# Patient Record
Sex: MALE | Race: WHITE | HISPANIC OR LATINO | Employment: FULL TIME | URBAN - METROPOLITAN AREA
[De-identification: names, ages, dates, MRNs, and addresses within clinical notes are randomized per-mention and may not be internally consistent; named-entity substitution may affect disease eponyms.]

---

## 2021-03-18 ENCOUNTER — IMMUNIZATION (OUTPATIENT)
Dept: FAMILY MEDICINE | Facility: CLINIC | Age: 41
End: 2021-03-18
Payer: OTHER GOVERNMENT

## 2021-03-18 DIAGNOSIS — Z23 NEED FOR VACCINATION: Primary | ICD-10-CM

## 2021-03-18 PROCEDURE — 91300 COVID-19, MRNA, LNP-S, PF, 30 MCG/0.3 ML DOSE VACCINE: ICD-10-PCS | Mod: ,,, | Performed by: NURSE ANESTHETIST, CERTIFIED REGISTERED

## 2021-03-18 PROCEDURE — 91300 COVID-19, MRNA, LNP-S, PF, 30 MCG/0.3 ML DOSE VACCINE: CPT | Mod: ,,, | Performed by: NURSE ANESTHETIST, CERTIFIED REGISTERED

## 2021-03-18 PROCEDURE — 0001A COVID-19, MRNA, LNP-S, PF, 30 MCG/0.3 ML DOSE VACCINE: ICD-10-PCS | Mod: CV19,,, | Performed by: NURSE ANESTHETIST, CERTIFIED REGISTERED

## 2021-03-18 PROCEDURE — 0001A COVID-19, MRNA, LNP-S, PF, 30 MCG/0.3 ML DOSE VACCINE: CPT | Mod: CV19,,, | Performed by: NURSE ANESTHETIST, CERTIFIED REGISTERED

## 2021-04-08 ENCOUNTER — IMMUNIZATION (OUTPATIENT)
Dept: FAMILY MEDICINE | Facility: CLINIC | Age: 41
End: 2021-04-08
Payer: OTHER GOVERNMENT

## 2021-04-08 DIAGNOSIS — Z23 NEED FOR VACCINATION: Primary | ICD-10-CM

## 2021-04-08 PROCEDURE — 0002A COVID-19, MRNA, LNP-S, PF, 30 MCG/0.3 ML DOSE VACCINE: ICD-10-PCS | Mod: CV19,,, | Performed by: FAMILY MEDICINE

## 2021-04-08 PROCEDURE — 0002A COVID-19, MRNA, LNP-S, PF, 30 MCG/0.3 ML DOSE VACCINE: CPT | Mod: CV19,,, | Performed by: FAMILY MEDICINE

## 2021-04-08 PROCEDURE — 91300 COVID-19, MRNA, LNP-S, PF, 30 MCG/0.3 ML DOSE VACCINE: ICD-10-PCS | Mod: ,,, | Performed by: FAMILY MEDICINE

## 2021-04-08 PROCEDURE — 91300 COVID-19, MRNA, LNP-S, PF, 30 MCG/0.3 ML DOSE VACCINE: CPT | Mod: ,,, | Performed by: FAMILY MEDICINE

## 2024-04-11 ENCOUNTER — OFFICE VISIT (OUTPATIENT)
Dept: URGENT CARE | Facility: CLINIC | Age: 44
End: 2024-04-11
Payer: COMMERCIAL

## 2024-04-11 VITALS
DIASTOLIC BLOOD PRESSURE: 70 MMHG | SYSTOLIC BLOOD PRESSURE: 116 MMHG | OXYGEN SATURATION: 96 % | HEIGHT: 66 IN | RESPIRATION RATE: 18 BRPM | TEMPERATURE: 100 F | BODY MASS INDEX: 25.71 KG/M2 | HEART RATE: 106 BPM | WEIGHT: 160 LBS

## 2024-04-11 DIAGNOSIS — J20.9 ACUTE BRONCHITIS, UNSPECIFIED ORGANISM: ICD-10-CM

## 2024-04-11 DIAGNOSIS — R50.9 FEVER, UNSPECIFIED FEVER CAUSE: ICD-10-CM

## 2024-04-11 DIAGNOSIS — Z72.0 VAPES NICOTINE CONTAINING SUBSTANCE: ICD-10-CM

## 2024-04-11 DIAGNOSIS — B96.89 ACUTE BACTERIAL BRONCHITIS: Primary | ICD-10-CM

## 2024-04-11 DIAGNOSIS — J20.8 ACUTE BACTERIAL BRONCHITIS: Primary | ICD-10-CM

## 2024-04-11 LAB
CTP QC/QA: YES
POC MOLECULAR INFLUENZA A AGN: NEGATIVE
POC MOLECULAR INFLUENZA B AGN: NEGATIVE

## 2024-04-11 PROCEDURE — 99204 OFFICE O/P NEW MOD 45 MIN: CPT | Mod: 25,S$GLB,, | Performed by: PHYSICIAN ASSISTANT

## 2024-04-11 PROCEDURE — 87502 INFLUENZA DNA AMP PROBE: CPT | Mod: QW,S$GLB,, | Performed by: PHYSICIAN ASSISTANT

## 2024-04-11 PROCEDURE — 71046 X-RAY EXAM CHEST 2 VIEWS: CPT | Mod: S$GLB,,, | Performed by: RADIOLOGY

## 2024-04-11 PROCEDURE — 94640 AIRWAY INHALATION TREATMENT: CPT | Mod: S$GLB,,, | Performed by: FAMILY MEDICINE

## 2024-04-11 RX ORDER — PREDNISONE 20 MG/1
40 TABLET ORAL
Status: COMPLETED | OUTPATIENT
Start: 2024-04-11 | End: 2024-04-11

## 2024-04-11 RX ORDER — IPRATROPIUM BROMIDE 0.5 MG/2.5ML
0.5 SOLUTION RESPIRATORY (INHALATION)
Status: COMPLETED | OUTPATIENT
Start: 2024-04-11 | End: 2024-04-11

## 2024-04-11 RX ORDER — PROMETHAZINE HYDROCHLORIDE AND DEXTROMETHORPHAN HYDROBROMIDE 6.25; 15 MG/5ML; MG/5ML
5 SYRUP ORAL EVERY 4 HOURS PRN
Qty: 118 ML | Refills: 0 | Status: SHIPPED | OUTPATIENT
Start: 2024-04-11 | End: 2024-04-21

## 2024-04-11 RX ORDER — ALBUTEROL SULFATE 0.83 MG/ML
2.5 SOLUTION RESPIRATORY (INHALATION)
Status: COMPLETED | OUTPATIENT
Start: 2024-04-11 | End: 2024-04-11

## 2024-04-11 RX ORDER — ALBUTEROL SULFATE 90 UG/1
2 AEROSOL, METERED RESPIRATORY (INHALATION) EVERY 6 HOURS PRN
Qty: 6.7 G | Refills: 0 | Status: SHIPPED | OUTPATIENT
Start: 2024-04-11

## 2024-04-11 RX ORDER — AMOXICILLIN AND CLAVULANATE POTASSIUM 875; 125 MG/1; MG/1
1 TABLET, FILM COATED ORAL EVERY 12 HOURS
Qty: 14 TABLET | Refills: 0 | Status: SHIPPED | OUTPATIENT
Start: 2024-04-11 | End: 2024-04-18

## 2024-04-11 RX ORDER — IBUPROFEN 200 MG
800 TABLET ORAL
Status: COMPLETED | OUTPATIENT
Start: 2024-04-11 | End: 2024-04-11

## 2024-04-11 RX ORDER — PREDNISONE 20 MG/1
40 TABLET ORAL DAILY
Qty: 8 TABLET | Refills: 0 | Status: SHIPPED | OUTPATIENT
Start: 2024-04-12 | End: 2024-04-16

## 2024-04-11 RX ADMIN — ALBUTEROL SULFATE 2.5 MG: 0.83 SOLUTION RESPIRATORY (INHALATION) at 09:04

## 2024-04-11 RX ADMIN — Medication 800 MG: at 09:04

## 2024-04-11 RX ADMIN — PREDNISONE 40 MG: 20 TABLET ORAL at 09:04

## 2024-04-11 RX ADMIN — IPRATROPIUM BROMIDE 0.5 MG: 0.5 SOLUTION RESPIRATORY (INHALATION) at 09:04

## 2024-04-11 NOTE — PROGRESS NOTES
"Subjective:      Patient ID: Ronaldo Lynch is a 43 y.o. male.    Vitals:  height is 5' 6" (1.676 m) and weight is 72.6 kg (160 lb). His oral temperature is 100 °F (37.8 °C). His blood pressure is 116/70 and his pulse is 106. His respiration is 18 and oxygen saturation is 96%.     Chief Complaint: Sinus Problem    Patient provider note starts here:  Patient presents to the clinic with complaints of flu-like illness for the past 8 days.  Reports that he really started feeling bad about 3 days ago.  He has been taking Sudafed for his symptoms without significant relief.  He reports cough, nasal congestion, chills but unaware that he had fever on presentation here.  His daughter is ill with similar symptoms as well.  Patient does vape but denies chest pain or SOB.     Sinus Problem  This is a new problem. Episode onset: 8 days ago. The problem has been gradually improving since onset. Maximum temperature: unmeasured. His pain is at a severity of 4/10. The pain is moderate. Associated symptoms include chills, congestion, coughing, diaphoresis, headaches and sinus pressure. Pertinent negatives include no neck pain, shortness of breath or sore throat. Treatments tried: aleve, mucinex, sudafed. The treatment provided mild relief.       Constitution: Positive for chills and sweating. Negative for fever (unmeasured).   HENT:  Positive for congestion, postnasal drip, sinus pain and sinus pressure. Negative for sore throat.    Neck: Negative for neck pain and neck stiffness.   Cardiovascular:  Negative for chest pain.   Respiratory:  Positive for cough and sputum production. Negative for chest tightness, shortness of breath and wheezing.    Gastrointestinal:  Negative for abdominal pain, nausea, vomiting and diarrhea.   Musculoskeletal:  Positive for muscle ache. Negative for pain.   Skin:  Negative for rash and wound.   Allergic/Immunologic: Negative for itching.   Neurological:  Positive for headaches. Negative for numbness " and tingling.      Objective:     Physical Exam   Constitutional: He is oriented to person, place, and time. He appears well-developed. He is cooperative.  Non-toxic appearance. He does not appear ill. No distress.   HENT:   Head: Normocephalic and atraumatic.   Ears:   Right Ear: Hearing, tympanic membrane, external ear and ear canal normal.   Left Ear: Hearing, tympanic membrane, external ear and ear canal normal.   Nose: Congestion present. No mucosal edema, rhinorrhea or nasal deformity. No epistaxis. Right sinus exhibits no maxillary sinus tenderness and no frontal sinus tenderness. Left sinus exhibits no maxillary sinus tenderness and no frontal sinus tenderness.   Mouth/Throat: Uvula is midline and mucous membranes are normal. No trismus in the jaw. Normal dentition. No uvula swelling. Posterior oropharyngeal erythema present. No oropharyngeal exudate or posterior oropharyngeal edema.   Eyes: Conjunctivae and lids are normal. No scleral icterus.   Neck: Trachea normal and phonation normal. Neck supple. No edema present. No erythema present. No neck rigidity present.   Cardiovascular: Normal rate, regular rhythm, normal heart sounds and normal pulses.   Pulmonary/Chest: Effort normal. No respiratory distress. He has no decreased breath sounds. He has wheezes (Decreased air movement noted throughout. Expiratory wheezing noted to the lower lung fields bilaterally.). He has no rhonchi.   Abdominal: Normal appearance.   Musculoskeletal: Normal range of motion.         General: No deformity. Normal range of motion.      Right lower leg: No edema.      Left lower leg: No edema.   Lymphadenopathy:     He has no cervical adenopathy.   Neurological: He is alert and oriented to person, place, and time. He exhibits normal muscle tone. Coordination normal.   Skin: Skin is warm, dry, intact, not diaphoretic and not pale.   Psychiatric: His speech is normal and behavior is normal. Judgment and thought content normal.    Nursing note and vitals reviewed.      Assessment:     1. Acute bacterial bronchitis    2. Fever, unspecified fever cause    3. Vapes nicotine containing substance    4. Acute bronchitis, unspecified organism      XR CHEST PA AND LATERAL  Result Date: 4/11/2024  EXAMINATION: XR CHEST PA AND LATERAL CLINICAL HISTORY: Acute bronchitis, unspecified TECHNIQUE: PA and lateral views of the chest were performed. COMPARISON: None FINDINGS: The lungs are clear, with normal appearance of pulmonary vasculature and no pleural effusion or pneumothorax. The cardiac silhouette is normal in size. The hilar and mediastinal contours are unremarkable. Visualized osseous structures show no acute abnormalities.   No acute radiographic findings in the chest. Electronically signed by: Jacob Horne MD Date: 04/11/2024 Time: 09:24     Results for orders placed or performed in visit on 04/11/24   POCT Influenza A/B MOLECULAR   Result Value Ref Range    POC Molecular Influenza A Ag Negative Negative    POC Molecular Influenza B Ag Negative Negative     Acceptable Yes      Plan:       Acute bacterial bronchitis  -     albuterol nebulizer solution 2.5 mg  -     ipratropium 0.02 % nebulizer solution 0.5 mg  -     predniSONE tablet 40 mg  -     XR CHEST PA AND LATERAL; Future; Expected date: 04/11/2024  -     amoxicillin-clavulanate 875-125mg (AUGMENTIN) 875-125 mg per tablet; Take 1 tablet by mouth every 12 (twelve) hours. for 7 days  Dispense: 14 tablet; Refill: 0  -     albuterol (PROVENTIL HFA) 90 mcg/actuation inhaler; Inhale 2 puffs into the lungs every 6 (six) hours as needed for Shortness of Breath or Wheezing. Rescue  Dispense: 6.7 g; Refill: 0  -     promethazine-dextromethorphan (PROMETHAZINE-DM) 6.25-15 mg/5 mL Syrp; Take 5 mLs by mouth every 4 (four) hours as needed (COUGH).  Dispense: 118 mL; Refill: 0  -     predniSONE (DELTASONE) 20 MG tablet; Take 2 tablets (40 mg total) by mouth once daily. for 4 days   Dispense: 8 tablet; Refill: 0    Fever, unspecified fever cause  -     ibuprofen tablet 800 mg  -     POCT Influenza A/B MOLECULAR    Vapes nicotine containing substance    Acute bronchitis, unspecified organism          Medical Decision Making:   History:   Old Medical Records: I decided to obtain old medical records.  Independently Interpreted Test(s):   I have ordered and independently interpreted X-rays - see summary below.  Clinical Tests:   Lab Tests: Ordered and Reviewed  Radiological Study: Ordered  Urgent Care Management:  A. Problem List:   -Acute: Fever, acute bronchitis    -Chronic: None  B. Differential diagnosis: viral vs bacterial URI, pharyngitis, otitis, COVID 19, influenza, pneumonia, bronchitis, viral syndrome   C. Diagnostic Testing Ordered: Flu, CXR   D. Diagnostic Testing Considered: None  E. Independent Historians: None  F. Urgent Care Midlevel Independent Results Interpretation: Flu negative, CXR reviewed and interpreted by me- no focal consolidation noted   G. Radiology: See radiology report above   H. Review of Previous Medical Records:   I. Home Medications Reviewed  J. Social Determinants of Health considered  K. Medical Decision Making and Disposition: Patient presents with complaints of cough, fever and other flu like symptoms for a total of 8 days now. On exam, he is febrile but nontoxic in appearance. He has wheezing noted. Was administered Motrin, Prednisone and a neb treatment in clinic with improvement. CXR negative for focal consolidation. Of note, his daughter tested positive for strep while here but covering him with abx anyway due to the length since the onset of his symptoms. ED precautions discussed, he verbalized understanding and agreed with plan.          Patient Instructions   You have been prescribed a steroid today. Take the prescription as directed. Steroids can increase blood sugar. You can also have the following when taking steroids: flushing, jitteriness, weight  gain, fluid retention, bone weakening. If you develop any adverse symptoms, stop taking the medication immediately.    Thank you for choosing [facility]!     Our goal in the Urgent Care is to always provide outstanding medical care. You may receive a survey by mail or e-mail in the next week regarding your experience today. We would greatly appreciate you completing and returning the survey. Your feedback provides us with a way to recognize our staff who provide very good care, and it helps us learn how to improve when your experience was below our aspiration of excellence.       We appreciate you trusting us with your medical care. We hope you feel better soon. We will be happy to take care of you for all of your future medical needs.    You must understand that you've received an Urgent Care treatment only and that you may be released before all your medical problems are known or treated. You, the patient, will arrange for follow up care as instructed.      Follow up with your PCP or specialty clinic as instructed in the next 2-3 days if not improved or as needed. You can call [phone] to schedule an appointment with appropriate provider.      If you condition worsens, we recommend that you receive another evaluation at the emergency room immediately or contact your primary medical clinic's after hours call service to discuss your concerns.      Please return here or go to the Emergency Department for any concerns or worsening condition.

## 2024-04-11 NOTE — PATIENT INSTRUCTIONS
You have been prescribed a steroid today. Take the prescription as directed. Steroids can increase blood sugar. You can also have the following when taking steroids: flushing, jitteriness, weight gain, fluid retention, bone weakening. If you develop any adverse symptoms, stop taking the medication immediately.    Thank you for choosing [facility]!     Our goal in the Urgent Care is to always provide outstanding medical care. You may receive a survey by mail or e-mail in the next week regarding your experience today. We would greatly appreciate you completing and returning the survey. Your feedback provides us with a way to recognize our staff who provide very good care, and it helps us learn how to improve when your experience was below our aspiration of excellence.       We appreciate you trusting us with your medical care. We hope you feel better soon. We will be happy to take care of you for all of your future medical needs.    You must understand that you've received an Urgent Care treatment only and that you may be released before all your medical problems are known or treated. You, the patient, will arrange for follow up care as instructed.      Follow up with your PCP or specialty clinic as instructed in the next 2-3 days if not improved or as needed. You can call [phone] to schedule an appointment with appropriate provider.      If you condition worsens, we recommend that you receive another evaluation at the emergency room immediately or contact your primary medical clinic's after hours call service to discuss your concerns.      Please return here or go to the Emergency Department for any concerns or worsening condition.